# Patient Record
Sex: FEMALE | Race: WHITE | NOT HISPANIC OR LATINO | URBAN - METROPOLITAN AREA
[De-identification: names, ages, dates, MRNs, and addresses within clinical notes are randomized per-mention and may not be internally consistent; named-entity substitution may affect disease eponyms.]

---

## 2022-11-28 ENCOUNTER — NEW PATIENT (OUTPATIENT)
Facility: LOCATION | Age: 27
End: 2022-11-28

## 2022-11-28 DIAGNOSIS — H43.392: ICD-10-CM

## 2022-11-28 DIAGNOSIS — H52.03: ICD-10-CM

## 2022-11-28 PROCEDURE — 99204 OFFICE O/P NEW MOD 45 MIN: CPT

## 2022-11-28 PROCEDURE — 92015 DETERMINE REFRACTIVE STATE: CPT

## 2022-11-28 PROCEDURE — 92499OPKAL KAL OPTOMAP RETINAL SCREENING, ELECTIVE

## 2022-11-28 ASSESSMENT — VISUAL ACUITY
OS_SC: 20/20
OD_SC: 20/20
OS_SC: 20/25
OU_SC: 20/20
OD_SC: 20/20
OU_SC: 20/20

## 2022-11-28 ASSESSMENT — KERATOMETRY
OS_K2POWER_DIOPTERS: 43.75
OS_AXISANGLE2_DEGREES: 99
OD_AXISANGLE2_DEGREES: 81
OS_AXISANGLE_DEGREES: 09
OS_K1POWER_DIOPTERS: 43.25
OD_K1POWER_DIOPTERS: 43.00
OD_AXISANGLE_DEGREES: 171
OD_K2POWER_DIOPTERS: 44.00

## 2022-11-29 ASSESSMENT — TONOMETRY
OS_IOP_MMHG: 12
OD_IOP_MMHG: 12

## 2023-10-24 ENCOUNTER — EMERGENCY VISIT (OUTPATIENT)
Facility: LOCATION | Age: 28
End: 2023-10-24

## 2023-10-24 DIAGNOSIS — S05.01XA: ICD-10-CM

## 2023-10-24 PROCEDURE — 99213 OFFICE O/P EST LOW 20 MIN: CPT

## 2023-10-24 ASSESSMENT — KERATOMETRY
OS_AXISANGLE2_DEGREES: 99
OS_K1POWER_DIOPTERS: 43.25
OD_K1POWER_DIOPTERS: 43.00
OD_AXISANGLE_DEGREES: 171
OS_K2POWER_DIOPTERS: 43.75
OD_AXISANGLE2_DEGREES: 81
OD_K2POWER_DIOPTERS: 44.00
OS_AXISANGLE_DEGREES: 09

## 2024-09-18 NOTE — PATIENT DISCUSSION
COUNSELING: Detail Level: Detailed General Sunscreen Counseling: I recommended a broad spectrum sunscreen with a SPF of 30 or higher.  I explained that SPF 30 sunscreens block approximately 97 percent of the sun's harmful rays.  Sunscreens should be applied at least 15 minutes prior to expected sun exposure and then every 2 hours after that as long as sun exposure continues. If swimming or exercising sunscreen should be reapplied every 45 minutes to an hour after getting wet or sweating.  One ounce, or the equivalent of a shot glass full of sunscreen, is adequate to protect the skin not covered by a bathing suit. I also recommended a lip balm with a sunscreen as well. Sun protective clothing can be used in lieu of sunscreen but must be worn the entire time you are exposed to the sun's rays.